# Patient Record
Sex: MALE | Race: WHITE | NOT HISPANIC OR LATINO | Employment: FULL TIME | URBAN - METROPOLITAN AREA
[De-identification: names, ages, dates, MRNs, and addresses within clinical notes are randomized per-mention and may not be internally consistent; named-entity substitution may affect disease eponyms.]

---

## 2023-11-01 ENCOUNTER — APPOINTMENT (OUTPATIENT)
Dept: RADIOLOGY | Facility: MEDICAL CENTER | Age: 28
End: 2023-11-01
Attending: STUDENT IN AN ORGANIZED HEALTH CARE EDUCATION/TRAINING PROGRAM
Payer: COMMERCIAL

## 2023-11-01 ENCOUNTER — HOSPITAL ENCOUNTER (EMERGENCY)
Facility: MEDICAL CENTER | Age: 28
End: 2023-11-01
Attending: STUDENT IN AN ORGANIZED HEALTH CARE EDUCATION/TRAINING PROGRAM
Payer: COMMERCIAL

## 2023-11-01 VITALS
HEART RATE: 59 BPM | TEMPERATURE: 98.3 F | OXYGEN SATURATION: 95 % | RESPIRATION RATE: 18 BRPM | BODY MASS INDEX: 33.8 KG/M2 | DIASTOLIC BLOOD PRESSURE: 72 MMHG | WEIGHT: 277.56 LBS | HEIGHT: 76 IN | SYSTOLIC BLOOD PRESSURE: 115 MMHG

## 2023-11-01 DIAGNOSIS — R00.2 PALPITATIONS: ICD-10-CM

## 2023-11-01 DIAGNOSIS — E87.6 HYPOKALEMIA: ICD-10-CM

## 2023-11-01 LAB
ALBUMIN SERPL BCP-MCNC: 4.3 G/DL (ref 3.2–4.9)
ALBUMIN/GLOB SERPL: 1.5 G/DL
ALP SERPL-CCNC: 60 U/L (ref 30–99)
ALT SERPL-CCNC: 20 U/L (ref 2–50)
ANION GAP SERPL CALC-SCNC: 11 MMOL/L (ref 7–16)
AST SERPL-CCNC: 29 U/L (ref 12–45)
BASOPHILS # BLD AUTO: 0.6 % (ref 0–1.8)
BASOPHILS # BLD: 0.06 K/UL (ref 0–0.12)
BILIRUB SERPL-MCNC: 0.4 MG/DL (ref 0.1–1.5)
BUN SERPL-MCNC: 17 MG/DL (ref 8–22)
CALCIUM ALBUM COR SERPL-MCNC: 9.2 MG/DL (ref 8.5–10.5)
CALCIUM SERPL-MCNC: 9.4 MG/DL (ref 8.5–10.5)
CHLORIDE SERPL-SCNC: 105 MMOL/L (ref 96–112)
CO2 SERPL-SCNC: 23 MMOL/L (ref 20–33)
CREAT SERPL-MCNC: 0.91 MG/DL (ref 0.5–1.4)
EKG IMPRESSION: NORMAL
EOSINOPHIL # BLD AUTO: 0.32 K/UL (ref 0–0.51)
EOSINOPHIL NFR BLD: 3 % (ref 0–6.9)
ERYTHROCYTE [DISTWIDTH] IN BLOOD BY AUTOMATED COUNT: 40.5 FL (ref 35.9–50)
GFR SERPLBLD CREATININE-BSD FMLA CKD-EPI: 118 ML/MIN/1.73 M 2
GLOBULIN SER CALC-MCNC: 2.9 G/DL (ref 1.9–3.5)
GLUCOSE SERPL-MCNC: 104 MG/DL (ref 65–99)
HCT VFR BLD AUTO: 44.5 % (ref 42–52)
HGB BLD-MCNC: 14.5 G/DL (ref 14–18)
IMM GRANULOCYTES # BLD AUTO: 0.05 K/UL (ref 0–0.11)
IMM GRANULOCYTES NFR BLD AUTO: 0.5 % (ref 0–0.9)
LYMPHOCYTES # BLD AUTO: 2.47 K/UL (ref 1–4.8)
LYMPHOCYTES NFR BLD: 22.9 % (ref 22–41)
MAGNESIUM SERPL-MCNC: 2.1 MG/DL (ref 1.5–2.5)
MCH RBC QN AUTO: 27 PG (ref 27–33)
MCHC RBC AUTO-ENTMCNC: 32.6 G/DL (ref 32.3–36.5)
MCV RBC AUTO: 82.7 FL (ref 81.4–97.8)
MONOCYTES # BLD AUTO: 1.09 K/UL (ref 0–0.85)
MONOCYTES NFR BLD AUTO: 10.1 % (ref 0–13.4)
NEUTROPHILS # BLD AUTO: 6.78 K/UL (ref 1.82–7.42)
NEUTROPHILS NFR BLD: 62.9 % (ref 44–72)
NRBC # BLD AUTO: 0 K/UL
NRBC BLD-RTO: 0 /100 WBC (ref 0–0.2)
PLATELET # BLD AUTO: 238 K/UL (ref 164–446)
PMV BLD AUTO: 9.7 FL (ref 9–12.9)
POTASSIUM SERPL-SCNC: 3.5 MMOL/L (ref 3.6–5.5)
PROT SERPL-MCNC: 7.2 G/DL (ref 6–8.2)
RBC # BLD AUTO: 5.38 M/UL (ref 4.7–6.1)
SODIUM SERPL-SCNC: 139 MMOL/L (ref 135–145)
TROPONIN T SERPL-MCNC: <6 NG/L (ref 6–19)
WBC # BLD AUTO: 10.8 K/UL (ref 4.8–10.8)

## 2023-11-01 PROCEDURE — 85025 COMPLETE CBC W/AUTO DIFF WBC: CPT

## 2023-11-01 PROCEDURE — 700102 HCHG RX REV CODE 250 W/ 637 OVERRIDE(OP): Mod: JZ | Performed by: STUDENT IN AN ORGANIZED HEALTH CARE EDUCATION/TRAINING PROGRAM

## 2023-11-01 PROCEDURE — 36415 COLL VENOUS BLD VENIPUNCTURE: CPT

## 2023-11-01 PROCEDURE — 71045 X-RAY EXAM CHEST 1 VIEW: CPT

## 2023-11-01 PROCEDURE — 80053 COMPREHEN METABOLIC PANEL: CPT

## 2023-11-01 PROCEDURE — 83735 ASSAY OF MAGNESIUM: CPT

## 2023-11-01 PROCEDURE — 84484 ASSAY OF TROPONIN QUANT: CPT

## 2023-11-01 PROCEDURE — 93005 ELECTROCARDIOGRAM TRACING: CPT

## 2023-11-01 PROCEDURE — 99283 EMERGENCY DEPT VISIT LOW MDM: CPT

## 2023-11-01 PROCEDURE — 93005 ELECTROCARDIOGRAM TRACING: CPT | Performed by: STUDENT IN AN ORGANIZED HEALTH CARE EDUCATION/TRAINING PROGRAM

## 2023-11-01 PROCEDURE — A9270 NON-COVERED ITEM OR SERVICE: HCPCS | Mod: JZ | Performed by: STUDENT IN AN ORGANIZED HEALTH CARE EDUCATION/TRAINING PROGRAM

## 2023-11-01 RX ORDER — POTASSIUM CHLORIDE 20 MEQ/1
20 TABLET, EXTENDED RELEASE ORAL ONCE
Status: COMPLETED | OUTPATIENT
Start: 2023-11-01 | End: 2023-11-01

## 2023-11-01 RX ADMIN — POTASSIUM CHLORIDE 20 MEQ: 1500 TABLET, EXTENDED RELEASE ORAL at 22:40

## 2023-11-02 NOTE — ED PROVIDER NOTES
"ED Provider Note    CHIEF COMPLAINT  Chief Complaint   Patient presents with    Palpitations     Pt states he was laying in bed and suddenly his heart started racing, he became dizzy light headed, lasting for 5 min then \"heart calmed down\".        EXTERNAL RECORDS REVIEWED  None    HPI/ROS  LIMITATION TO HISTORY   Select: : None  OUTSIDE HISTORIAN(S):  None    Bernard Staples is a 28 y.o. male who presents with palpitations.  Patient says that he was lying in bed this evening when he suddenly felt his heart racing in his chest.  He also stated that it felt irregular. He felt lightheaded and slightly short of breath when he had the palpitations.  He denies any associated chest pain but he did feel a 'thud' in his chest.  He says that it seemed to improve however he had another episode while in the waiting room here.   None of these episodes have been associated with exertion.  He reports feeling well throughout the day today, no recent illness.  It was his birthday yesterday and he does not endorse some alcohol use, but is not a daily or heavy drinker.  He denies any other recreational drug drug use.  He has no prior cardiac history but did wear a Holter monitor as a child, reports no significant findings.  No family history of sudden cardiac death.  Today he has had normal p.o. intake.  He denies any symptoms or palpitations at this time.    PAST MEDICAL HISTORY   Denies    SURGICAL HISTORY  patient denies any surgical history    FAMILY HISTORY  History reviewed. No pertinent family history.    SOCIAL HISTORY  Social History     Tobacco Use    Smoking status: Never    Smokeless tobacco: Never   Vaping Use    Vaping Use: Every day    Substances: Nicotine, THC   Substance and Sexual Activity    Alcohol use: Yes     Comment: occ    Drug use: Yes     Types: Inhaled     Comment: marijuana    Sexual activity: Not on file       CURRENT MEDICATIONS  Home Medications    **Home medications have not yet been reviewed for " "this encounter**         ALLERGIES  No Known Allergies    PHYSICAL EXAM  VITAL SIGNS: /72   Pulse (!) 59   Temp 36.8 °C (98.3 °F)   Resp 18   Ht 1.93 m (6' 4\")   Wt (!) 126 kg (277 lb 9 oz)   SpO2 95%   BMI 33.79 kg/m²    Constitutional: Awake and alert . Non toxic  HENT: Normal inspection   Eyes: Normal inspection  Neck: Grossly normal range of motion.  Cardiovascular: Normal heart rate, Normal rhythm.  Symmetric peripheral pulses.   Thorax & Lungs: No respiratory distress, No wheezing, No rales, No rhonchi, No chest tenderness.   Abdomen: Soft, non-distended, nontender to palpation in all 4 quadrants, no mass  Skin: No obvious rash.  Extremities: Warm, well perfused. No clubbing, cyanosis. No lower extremity edema   Neurologic: Grossly normal   Psychiatric: Normal for situation      DIAGNOSTIC STUDIES / PROCEDURES  EKG  I have independently interpreted this EKG  Results for orders placed or performed during the hospital encounter of 23   EKG (NOW)   Result Value Ref Range    Report       [facility] Emergency Dept.    Test Date:  2023  Pt Name:    CLINT ARANDA                   Department: ER  MRN:        [id number]                      Room:  Gender:     Male                         Technician: [id number]  :        1995                   Requested By:ER TRIAGE PROTOCOL  Order #:    [id number]                    Reading MD: Carrie Rea    Measurements  Intervals                                Axis  Rate:       67                           P:          27  VT:         168                          QRS:        -45  QRSD:       117                          T:          27  QT:         416  QTc:        439    Interpretive Statements  Sinus rhythm  Incomplete RBBB and LAFB  No previous ECG available for comparison  Electronically Signed On 2023 20:58:12 PDT by Carrie Rea           LABS  Results for orders placed or performed during the hospital encounter of 23   CBC " WITH DIFFERENTIAL   Result Value Ref Range    WBC 10.8 4.8 - 10.8 K/uL    RBC 5.38 4.70 - 6.10 M/uL    Hemoglobin 14.5 14.0 - 18.0 g/dL    Hematocrit 44.5 42.0 - 52.0 %    MCV 82.7 81.4 - 97.8 fL    MCH 27.0 27.0 - 33.0 pg    MCHC 32.6 32.3 - 36.5 g/dL    RDW 40.5 35.9 - 50.0 fL    Platelet Count 238 164 - 446 K/uL    MPV 9.7 9.0 - 12.9 fL    Neutrophils-Polys 62.90 44.00 - 72.00 %    Lymphocytes 22.90 22.00 - 41.00 %    Monocytes 10.10 0.00 - 13.40 %    Eosinophils 3.00 0.00 - 6.90 %    Basophils 0.60 0.00 - 1.80 %    Immature Granulocytes 0.50 0.00 - 0.90 %    Nucleated RBC 0.00 0.00 - 0.20 /100 WBC    Neutrophils (Absolute) 6.78 1.82 - 7.42 K/uL    Lymphs (Absolute) 2.47 1.00 - 4.80 K/uL    Monos (Absolute) 1.09 (H) 0.00 - 0.85 K/uL    Eos (Absolute) 0.32 0.00 - 0.51 K/uL    Baso (Absolute) 0.06 0.00 - 0.12 K/uL    Immature Granulocytes (abs) 0.05 0.00 - 0.11 K/uL    NRBC (Absolute) 0.00 K/uL   COMP METABOLIC PANEL   Result Value Ref Range    Sodium 139 135 - 145 mmol/L    Potassium 3.5 (L) 3.6 - 5.5 mmol/L    Chloride 105 96 - 112 mmol/L    Co2 23 20 - 33 mmol/L    Anion Gap 11.0 7.0 - 16.0    Glucose 104 (H) 65 - 99 mg/dL    Bun 17 8 - 22 mg/dL    Creatinine 0.91 0.50 - 1.40 mg/dL    Calcium 9.4 8.5 - 10.5 mg/dL    Correct Calcium 9.2 8.5 - 10.5 mg/dL    AST(SGOT) 29 12 - 45 U/L    ALT(SGPT) 20 2 - 50 U/L    Alkaline Phosphatase 60 30 - 99 U/L    Total Bilirubin 0.4 0.1 - 1.5 mg/dL    Albumin 4.3 3.2 - 4.9 g/dL    Total Protein 7.2 6.0 - 8.2 g/dL    Globulin 2.9 1.9 - 3.5 g/dL    A-G Ratio 1.5 g/dL   TROPONIN   Result Value Ref Range    Troponin T <6 6 - 19 ng/L   MAGNESIUM   Result Value Ref Range    Magnesium 2.1 1.5 - 2.5 mg/dL   ESTIMATED GFR   Result Value Ref Range    GFR (CKD-EPI) 118 >60 mL/min/1.73 m 2   EKG (NOW)   Result Value Ref Range    Report       [facility] Emergency Dept.    Test Date:  2023-11-01  Pt Name:    CLINT ARANDA                   Department: ER  MRN:        [id number]                       Room:  Gender:     Male                         Technician: [id number]  :        1995                   Requested By:ER TRIAGE PROTOCOL  Order #:    [id number]                    Reading MD: Carrie Rea    Measurements  Intervals                                Axis  Rate:       67                           P:          27  TX:         168                          QRS:        -45  QRSD:       117                          T:          27  QT:         416  QTc:        439    Interpretive Statements  Sinus rhythm  Incomplete RBBB and LAFB  No previous ECG available for comparison  Electronically Signed On 2023 20:58:12 PDT by Carrie Rea           RADIOLOGY  I have independently interpreted the diagnostic imaging associated with this visit and am waiting the final reading from the radiologist.   My preliminary interpretation is as follows: No cardiomegaly   Radiologist interpretation:   DX-CHEST-PORTABLE (1 VIEW)   Final Result         1.  No acute cardiopulmonary disease.            COURSE & MEDICAL DECISION MAKING    ED Observation Status? No; Patient does not meet criteria for ED Observation.     INITIAL ASSESSMENT, COURSE AND PLAN  Care Narrative: Patient is a 28 y.o. male who presents to the Emergency Department, with feelings of palpitations.     Differential diagnoses considered include, but not limited to: A. fib; a flutter; SVT; PVCs; WPW: heart block; prolonged QTc; electrolyte disturbances; ACS; anemia; caffeine or alcohol induced palpitations.    Patient arrived stable, afebrile, and in good condition. Patient placed on telemetry for monitoring.   ECG reassuring and notable for no arrhythmia, heart block, prolonged QTc, PVC's or WPW. Labs notable for negative troponin, no anemia, and no significant electrolyte abnormalities, except mild hypokalemia (this was repleted orally).  ACS unlikely given negative troponin, reassuring ECG, no risk factors for ACS, and Heart Score of zero.  He has no murmur or high risk features to warrant prolonged observation or admission to the hospital. Etiology of palpitations unclear.  He had no abnormal events noted on telemetry during his time in the ER and he remains asymptomatic.  Patient was ultimately discharged home with strict return precautions, instructions to follow up with PCP for Holter monitor placement and minimize any alcohol intake.    Strict return precautions and follow-up instructions were given, as detailed in patient's discharge paperwork.        DISPOSITION AND DISCUSSIONS  I have discussed management of the patient with the following physicians and LJ's:  None    Discussion of management with other Q or appropriate source(s): None     Escalation of care considered, and ultimately not performed:acute inpatient care management, however at this time, the patient is most appropriate for outpatient management    Barriers to care at this time, including but not limited to: Patient does not have established PCP.     Decision tools and prescription drugs considered including, but not limited to: HEART Score 0 .    FINAL DIAGNOSIS  1. Palpitations Acute   2. Hypokalemia           Electronically signed by: Carrie Rea M.D., 11/1/2023 9:04 PM

## 2023-11-02 NOTE — DISCHARGE INSTRUCTIONS
Please follow-up as an outpatient. Your primary doctor can set you up with a Holter Monitor.    Please return if you have chest pain, shortness of breath, persistent or worsening symptoms.

## 2023-11-02 NOTE — ED NOTES
Pt and family/friend at bedside educated regarding discharge instructions and demonstrated understanding. Pt ambulatory upon discharge and does not appear to be in any distress at this time. Pt's discharge paperwork and belongings sent home with pt and family/friend.

## 2023-11-02 NOTE — ED TRIAGE NOTES
"Chief Complaint   Patient presents with    Palpitations     Pt states he was laying in bed and suddenly his heart started racing, he became dizzy light headed, lasting for 5 min then \"heart calmed down\".      Pt ambulatory to triage for above complaint. Pt reports palpitations have been intermittent for past hour lasting only 5 min. Pt denies other recreational drug use besides marijuana, drinking high caffeinated beverages, or any chest pain.    Pt is alert/oriented and follows commands. Pt speaking in full sentences and responds appropriately to questions. No acute distress noted in triage and respirations are even and unlabored.     Pt placed in lobby and educated on triage process. Pt encouraged to alert staff for any changes in condition.    "

## 2023-12-05 ENCOUNTER — APPOINTMENT (OUTPATIENT)
Dept: MEDICAL GROUP | Facility: PHYSICIAN GROUP | Age: 28
End: 2023-12-05
Attending: STUDENT IN AN ORGANIZED HEALTH CARE EDUCATION/TRAINING PROGRAM
Payer: COMMERCIAL

## 2024-01-24 ENCOUNTER — OFFICE VISIT (OUTPATIENT)
Dept: MEDICAL GROUP | Facility: PHYSICIAN GROUP | Age: 29
End: 2024-01-24
Attending: STUDENT IN AN ORGANIZED HEALTH CARE EDUCATION/TRAINING PROGRAM
Payer: COMMERCIAL

## 2024-01-24 VITALS
HEIGHT: 76 IN | DIASTOLIC BLOOD PRESSURE: 70 MMHG | WEIGHT: 293 LBS | TEMPERATURE: 98.8 F | BODY MASS INDEX: 35.68 KG/M2 | OXYGEN SATURATION: 94 % | SYSTOLIC BLOOD PRESSURE: 120 MMHG | HEART RATE: 76 BPM

## 2024-01-24 DIAGNOSIS — M25.562 ACUTE PAIN OF LEFT KNEE: Primary | ICD-10-CM

## 2024-01-24 PROCEDURE — 3078F DIAST BP <80 MM HG: CPT | Performed by: NURSE PRACTITIONER

## 2024-01-24 PROCEDURE — 99214 OFFICE O/P EST MOD 30 MIN: CPT | Performed by: NURSE PRACTITIONER

## 2024-01-24 PROCEDURE — 3074F SYST BP LT 130 MM HG: CPT | Performed by: NURSE PRACTITIONER

## 2024-01-24 ASSESSMENT — PATIENT HEALTH QUESTIONNAIRE - PHQ9: CLINICAL INTERPRETATION OF PHQ2 SCORE: 0

## 2024-01-24 ASSESSMENT — ENCOUNTER SYMPTOMS
HEADACHES: 0
SHORTNESS OF BREATH: 0
ABDOMINAL PAIN: 0
CHILLS: 0
VOMITING: 0
DIZZINESS: 0
NAUSEA: 0
FEVER: 0
PALPITATIONS: 0

## 2024-01-24 ASSESSMENT — FIBROSIS 4 INDEX: FIB4 SCORE: 0.76

## 2024-01-24 NOTE — PROGRESS NOTES
"Subjective:     CC: Establish care    HPI:   Bernard is a 28 y.o. male presents to establish care. Previous PCP: none. Specialists: None. Pt would like to discuss the following today:    Problem   Left Knee Pain    States that 2 weeks ago, he stepped into the pool and his foot slipped out and his knee twisted out and he felt his knee pop. It became immediately swollen and quite painful, 7/10. He was able to walk afterwards, but it was painful. Has been resting it as much as he can. Icing and using compression. He states that the pain has been improving (now at 3/10). States that the swelling has slowly improving.         Health Maintenance/Immunizations: Completed    No current outpatient medications on file.     No current facility-administered medications for this visit.     History reviewed. No pertinent past medical history.    History reviewed. No pertinent surgical history.     Family History   Problem Relation Age of Onset    Heart Attack Maternal Uncle         around 54 y/o (was a heavy drinker)     Social History     Tobacco Use    Smoking status: Never    Smokeless tobacco: Never   Vaping Use    Vaping Use: Every day    Substances: Nicotine, THC   Substance Use Topics    Alcohol use: Yes     Alcohol/week: 5.4 oz     Types: 9 Cans of beer per week    Drug use: Yes     Types: Inhaled, Marijuana     Comment: marijuana      Review of Systems   Constitutional:  Negative for chills and fever.   Respiratory:  Negative for shortness of breath.    Cardiovascular:  Negative for chest pain and palpitations.   Gastrointestinal:  Negative for abdominal pain, nausea and vomiting.   Neurological:  Negative for dizziness and headaches.      Objective:     /70 (BP Location: Left arm, Patient Position: Sitting, BP Cuff Size: Adult)   Pulse 76   Temp 37.1 °C (98.8 °F) (Temporal)   Ht 1.93 m (6' 4\")   Wt (!) 133 kg (293 lb)   SpO2 94%   BMI 35.67 kg/m²  Body mass index is 35.67 kg/m².     Physical " Exam  Constitutional:       Appearance: Normal appearance.   Eyes:      Conjunctiva/sclera: Conjunctivae normal.      Pupils: Pupils are equal, round, and reactive to light.   Cardiovascular:      Rate and Rhythm: Normal rate and regular rhythm.      Heart sounds: Normal heart sounds. No murmur heard.  Pulmonary:      Effort: Pulmonary effort is normal. No respiratory distress.      Breath sounds: Normal breath sounds.   Musculoskeletal:      Left knee: Swelling present. Decreased range of motion. Tenderness present over the medial joint line.      Instability Tests: Anterior drawer test negative. Medial Jovon test positive.   Lymphadenopathy:      Cervical: No cervical adenopathy.   Skin:     General: Skin is warm and dry.   Neurological:      General: No focal deficit present.      Mental Status: He is alert and oriented to person, place, and time.   Psychiatric:         Mood and Affect: Mood normal.         Behavior: Behavior normal.        Assessment and Plan:   28 y.o. male with the following -    1. Acute pain of left knee  Acute problem. Very strong suspicion for meniscal injury, but will obtain imaging to evaluate this further. Recommended continuing with compression, ice and elevation. Physical therapy recommended, but he declined due to time constraints. Handout with exercises provided. Discussed return to clinic precautions. He was agreeable to plan.   - MR-KNEE-W/O LEFT; Future    I spent a total of 32 minutes with record review, exam, communication with the patient, communication with other providers, and documentation of this encounter.    Return if symptoms worsen or fail to improve.    Please note that this dictation was created using voice recognition software. I have made every reasonable attempt to correct obvious errors, but I expect that there are errors of grammar and possibly content that I did not discover before finalizing the note.

## 2024-02-08 ENCOUNTER — OFFICE VISIT (OUTPATIENT)
Dept: DERMATOLOGY | Facility: IMAGING CENTER | Age: 29
End: 2024-02-08
Payer: COMMERCIAL

## 2024-02-08 DIAGNOSIS — L72.3 SEBACEOUS CYST: ICD-10-CM

## 2024-02-08 DIAGNOSIS — L98.9 ENLARGING SKIN LESION: ICD-10-CM

## 2024-02-08 DIAGNOSIS — D22.9 NEVUS: ICD-10-CM

## 2024-02-08 DIAGNOSIS — L90.5 SCAR: ICD-10-CM

## 2024-02-08 PROBLEM — R10.13 EPIGASTRIC ABDOMINAL PAIN: Status: ACTIVE | Noted: 2020-07-09

## 2024-02-08 PROBLEM — A74.9 CHLAMYDIA INFECTION: Status: ACTIVE | Noted: 2019-04-14

## 2024-02-08 PROBLEM — F90.0 ATTENTION DEFICIT HYPERACTIVITY DISORDER (ADHD), PREDOMINANTLY INATTENTIVE TYPE: Chronic | Status: ACTIVE | Noted: 2021-01-29

## 2024-02-08 PROCEDURE — 99203 OFFICE O/P NEW LOW 30 MIN: CPT | Performed by: DERMATOLOGY

## 2024-02-08 RX ORDER — DOXYCYCLINE HYCLATE 100 MG
100 TABLET ORAL 2 TIMES DAILY
Qty: 28 TABLET | Refills: 0 | Status: SHIPPED | OUTPATIENT
Start: 2024-02-08

## 2024-02-08 NOTE — PROGRESS NOTES
CC: Spot on rt cheek. Left cheek lesion      Subjective: New Patient here for a spot on rt cheek. Pt states it was a cyst that was treated before but now its bump  Pt also has a lesion on left cheek for evaluation    History of skin cancer: No  History of precancers/actinic keratoses: No  History of biopsies:Yes, Details: Benign  History of blistering/severe sunburns:Yes, Details: back  Family history of skin cancer:No  Family history of atypical moles:No      ROS: no fevers/chills. No itch.  No cough  Relevant PMH:NC  Social: NS    PE: Gen:WDWN male in NAD. Skin: focal exam: scalp on scalp. Cystic nodule approx 1cm on right facial cheek, minimal erythema and no notable TTP.  Left facial cheek - skin colored papule appearing benign      A/P: cyst/growing, hx of infections: right face  -reviewed dx/tx  -doxycycline 100mg PO BID, se reviewed  -f/u PRN Surgery - reviewed R/B/A and PA completed to schedule    Nevus, left face:   -benign/reassurance    Scar scalp: well healed    I have reviewed medications relevant to my specialty.

## 2024-02-22 ENCOUNTER — OFFICE VISIT (OUTPATIENT)
Dept: DERMATOLOGY | Facility: IMAGING CENTER | Age: 29
End: 2024-02-22
Payer: COMMERCIAL

## 2024-02-22 VITALS
BODY MASS INDEX: 32.88 KG/M2 | DIASTOLIC BLOOD PRESSURE: 90 MMHG | WEIGHT: 270 LBS | SYSTOLIC BLOOD PRESSURE: 122 MMHG | TEMPERATURE: 99.2 F | HEIGHT: 76 IN

## 2024-02-22 DIAGNOSIS — L72.3 SEBACEOUS CYST: ICD-10-CM

## 2024-02-22 ASSESSMENT — FIBROSIS 4 INDEX: FIB4 SCORE: 0.76

## 2024-02-23 NOTE — PROGRESS NOTES
"BENIGN EXCISION PROCEDURE NOTE:    Risks, benefits and alternatives of procedure, including, but not limited to scar/poor cosmetic outcome, bleeding, pain, infection, nerve damage, recurrence of lesion, failed surgery, and need for further surgery, were discussed and written informed consent obtained. Verbal time out completed.     Allergies reviewed: Yes  Pacemaker/defibrillator: No  Artificial joints: No  Antibiotics given: No  Blood thinners/anticoagulants: No    Pre-op diagnosis: cyst/shrinking skin lesion  Post-op diagnosis: Same  Indication: symptomatic (history of enlargement)    Site:right cheek  Pre-op size: 0.8cm  Post-op antibiotics: no    BP (!) 122/90   Temp 37.3 °C (99.2 °F)   Ht 1.93 m (6' 4\")   Wt 122 kg (270 lb)     Procedure: Area of surgery was prepped with alcohol, marked with a sterile marking pen. Anesthesia with 1% lidocaine with epinephrine administered with 30 gauge needle. The area was again cleaned with chlorhexidine swab. With sterile technique, an 8mm punch instrument was used to make an incision over the lesion to the level of the subcutaneous fat. Dissection was completed with iris/tissue scissors, and the mass was removed. Hemostasis was achieved with electrodessication.  Specimen was placed into biopsy container and sent to pathology by staff.    Intermediate closure note:  5.0 monocryl buried vertical mattress sutures were placed to close dead space. 6.0 prolene superficial interrupted sutures were placed to approximate wound edges.  Vaseline applied to wound with bandage. Patient tolerated procedure well and there were no complications, blood loss was minimal.     Final wound size: 1cm    Bandage was placed with vaseline, telfa, gauze and tape. Wound care was discussed with the patient, and written instructions were provided. Patient to return to clinic in 7 days for suture removal. Patient to call us if any problems or concerns with the procedure site arise prior to scheduled " appointment.    Brea Treviño MD

## 2024-04-08 ENCOUNTER — HOSPITAL ENCOUNTER (OUTPATIENT)
Dept: LAB | Facility: MEDICAL CENTER | Age: 29
End: 2024-04-08
Attending: NURSE PRACTITIONER
Payer: COMMERCIAL

## 2024-04-08 ENCOUNTER — OFFICE VISIT (OUTPATIENT)
Dept: MEDICAL GROUP | Facility: PHYSICIAN GROUP | Age: 29
End: 2024-04-08
Payer: COMMERCIAL

## 2024-04-08 VITALS
OXYGEN SATURATION: 98 % | BODY MASS INDEX: 32.88 KG/M2 | WEIGHT: 270 LBS | HEART RATE: 80 BPM | TEMPERATURE: 98.3 F | SYSTOLIC BLOOD PRESSURE: 140 MMHG | DIASTOLIC BLOOD PRESSURE: 80 MMHG | HEIGHT: 76 IN

## 2024-04-08 DIAGNOSIS — R10.13 EPIGASTRIC ABDOMINAL PAIN: ICD-10-CM

## 2024-04-08 LAB
ALBUMIN SERPL BCP-MCNC: 5 G/DL (ref 3.2–4.9)
ALBUMIN/GLOB SERPL: 1.5 G/DL
ALP SERPL-CCNC: 59 U/L (ref 30–99)
ALT SERPL-CCNC: 10 U/L (ref 2–50)
ANION GAP SERPL CALC-SCNC: 15 MMOL/L (ref 7–16)
AST SERPL-CCNC: 22 U/L (ref 12–45)
BASOPHILS # BLD AUTO: 0.6 % (ref 0–1.8)
BASOPHILS # BLD: 0.05 K/UL (ref 0–0.12)
BILIRUB SERPL-MCNC: 0.9 MG/DL (ref 0.1–1.5)
BUN SERPL-MCNC: 15 MG/DL (ref 8–22)
CALCIUM ALBUM COR SERPL-MCNC: 9.3 MG/DL (ref 8.5–10.5)
CALCIUM SERPL-MCNC: 10.1 MG/DL (ref 8.5–10.5)
CHLORIDE SERPL-SCNC: 102 MMOL/L (ref 96–112)
CO2 SERPL-SCNC: 19 MMOL/L (ref 20–33)
CREAT SERPL-MCNC: 0.99 MG/DL (ref 0.5–1.4)
EOSINOPHIL # BLD AUTO: 0.08 K/UL (ref 0–0.51)
EOSINOPHIL NFR BLD: 1 % (ref 0–6.9)
ERYTHROCYTE [DISTWIDTH] IN BLOOD BY AUTOMATED COUNT: 39.3 FL (ref 35.9–50)
GFR SERPLBLD CREATININE-BSD FMLA CKD-EPI: 106 ML/MIN/1.73 M 2
GLOBULIN SER CALC-MCNC: 3.3 G/DL (ref 1.9–3.5)
GLUCOSE SERPL-MCNC: 92 MG/DL (ref 65–99)
HCT VFR BLD AUTO: 51.2 % (ref 42–52)
HGB BLD-MCNC: 17.4 G/DL (ref 14–18)
IMM GRANULOCYTES # BLD AUTO: 0.03 K/UL (ref 0–0.11)
IMM GRANULOCYTES NFR BLD AUTO: 0.4 % (ref 0–0.9)
LIPASE SERPL-CCNC: 26 U/L (ref 11–82)
LYMPHOCYTES # BLD AUTO: 2.23 K/UL (ref 1–4.8)
LYMPHOCYTES NFR BLD: 28.7 % (ref 22–41)
MCH RBC QN AUTO: 27.8 PG (ref 27–33)
MCHC RBC AUTO-ENTMCNC: 34 G/DL (ref 32.3–36.5)
MCV RBC AUTO: 81.9 FL (ref 81.4–97.8)
MONOCYTES # BLD AUTO: 0.81 K/UL (ref 0–0.85)
MONOCYTES NFR BLD AUTO: 10.4 % (ref 0–13.4)
NEUTROPHILS # BLD AUTO: 4.57 K/UL (ref 1.82–7.42)
NEUTROPHILS NFR BLD: 58.9 % (ref 44–72)
NRBC # BLD AUTO: 0 K/UL
NRBC BLD-RTO: 0 /100 WBC (ref 0–0.2)
PLATELET # BLD AUTO: 298 K/UL (ref 164–446)
PMV BLD AUTO: 10.4 FL (ref 9–12.9)
POTASSIUM SERPL-SCNC: 4.2 MMOL/L (ref 3.6–5.5)
PROT SERPL-MCNC: 8.3 G/DL (ref 6–8.2)
RBC # BLD AUTO: 6.25 M/UL (ref 4.7–6.1)
SODIUM SERPL-SCNC: 136 MMOL/L (ref 135–145)
TSH SERPL DL<=0.005 MIU/L-ACNC: 1.58 UIU/ML (ref 0.38–5.33)
WBC # BLD AUTO: 7.8 K/UL (ref 4.8–10.8)

## 2024-04-08 PROCEDURE — 84443 ASSAY THYROID STIM HORMONE: CPT

## 2024-04-08 PROCEDURE — 36415 COLL VENOUS BLD VENIPUNCTURE: CPT

## 2024-04-08 PROCEDURE — 3077F SYST BP >= 140 MM HG: CPT | Performed by: NURSE PRACTITIONER

## 2024-04-08 PROCEDURE — 3079F DIAST BP 80-89 MM HG: CPT | Performed by: NURSE PRACTITIONER

## 2024-04-08 PROCEDURE — 83690 ASSAY OF LIPASE: CPT

## 2024-04-08 PROCEDURE — 80053 COMPREHEN METABOLIC PANEL: CPT

## 2024-04-08 PROCEDURE — 99213 OFFICE O/P EST LOW 20 MIN: CPT | Performed by: NURSE PRACTITIONER

## 2024-04-08 PROCEDURE — 85025 COMPLETE CBC W/AUTO DIFF WBC: CPT

## 2024-04-08 ASSESSMENT — FIBROSIS 4 INDEX: FIB4 SCORE: 0.76

## 2024-04-08 ASSESSMENT — ENCOUNTER SYMPTOMS
PALPITATIONS: 1
CHILLS: 0
DIZZINESS: 1
HEADACHES: 0
SHORTNESS OF BREATH: 0
ABDOMINAL PAIN: 1
FEVER: 0
WEIGHT LOSS: 0

## 2024-04-08 NOTE — PROGRESS NOTES
Subjective:     CC: Heart racing and abdominal pain    HPI:   Bernard presents today with abdominal pain. He reports his symptoms started 1 week ago. He initially developed an episode of lightheadedness and felt his heart racing. This episode lasted for about 1-2 hours. He states that his systolic blood pressure was in the 160s and pulse in the 140s at rest. He had a similar episode in 11/2023, in which he was evaluated at the ER with a normal EKG and labs.   The following day, he woke up with epigastric abdominal pain and diarrhea. Diarrhea lasted about 3 days. He also felt tingling in his legs and hands. Since Saturday, he has been taking OTC Tums and Pepto bismol, and Zantac 360 twice a day. Reports that Zantac has been most effective in reducing his symptoms.   His symptoms have been intermittent since they started about 1 week ago. Denies nausea/vomiting. He endorses some tightness in his chest, but denies heartburn. He does endorse increased flatulence and gases.    Aggravating factors: tomato soup  Sick contacts: none  Denies recent antibiotic use or travel. Denies chronic NSAID use or chronic alcohol use.   Of note, he did get a new nicotine stick, but he stopped using it since onset of symptoms. He has not had any alcohol or soda as well.   The diarrhea resolved after 3 days and currently, he is having constipation (hard, small stools). Denies bloody stools, but states that his stool is darker than usual.     Health Maintenance: Completed    No current outpatient medications on file.     No current facility-administered medications for this visit.     ROS:  Review of Systems   Constitutional:  Negative for chills, fever and weight loss.   Respiratory:  Negative for shortness of breath.    Cardiovascular:  Positive for palpitations. Negative for chest pain.   Gastrointestinal:  Positive for abdominal pain.   Neurological:  Positive for dizziness. Negative for headaches.     Objective:     Exam:  BP (!) 140/80  "(BP Location: Right arm, Patient Position: Sitting, BP Cuff Size: Adult)   Pulse 80   Temp 36.8 °C (98.3 °F) (Temporal)   Ht 1.93 m (6' 4\")   Wt 122 kg (270 lb)   SpO2 98%   BMI 32.87 kg/m²  Body mass index is 32.87 kg/m².    Physical Exam  Constitutional:       Appearance: Normal appearance.   Eyes:      Conjunctiva/sclera: Conjunctivae normal.      Pupils: Pupils are equal, round, and reactive to light.   Cardiovascular:      Rate and Rhythm: Normal rate and regular rhythm.      Heart sounds: Normal heart sounds. No murmur heard.  Pulmonary:      Effort: Pulmonary effort is normal. No respiratory distress.      Breath sounds: Normal breath sounds.   Abdominal:      General: Bowel sounds are normal. There is no distension.      Palpations: Abdomen is soft.      Tenderness: There is abdominal tenderness in the epigastric area and left upper quadrant. There is no guarding.   Musculoskeletal:      Right lower leg: No edema.      Left lower leg: No edema.   Lymphadenopathy:      Cervical: No cervical adenopathy.   Skin:     General: Skin is warm and dry.   Neurological:      General: No focal deficit present.      Mental Status: He is alert and oriented to person, place, and time.   Psychiatric:         Mood and Affect: Mood normal.         Behavior: Behavior normal.       Assessment & Plan:     28 y.o. male with the following -     1. Epigastric abdominal pain  Acute problem. Various potential differentials. Vitals signs stable. Blood pressure may be elevated s/t pain. He has not had any more episode of palpitations. Discussed a monitor, and for now, he deferred. We will order lab evaluation for abdominal symptoms. Continue with OTC famotidine and consider trial with PPI therapy once labs collected. Continue with avoidance of alcohol. ER precautions discussed.   - H.PYLORI STOOL ANTIGEN; Future  - Comp Metabolic Panel; Future  - CBC WITH DIFFERENTIAL; Future  - TSH WITH REFLEX TO FT4; Future  - OCCULT BLOOD " FECES IMMUNOASSAY; Future  - LIPASE; Future    Return in about 1 week (around 4/15/2024) for abdominal pain follow up.    Please note that this dictation was created using voice recognition software. I have made every reasonable attempt to correct obvious errors, but I expect that there are errors of grammar and possibly content that I did not discover before finalizing the note.

## 2024-04-09 ENCOUNTER — HOSPITAL ENCOUNTER (OUTPATIENT)
Facility: MEDICAL CENTER | Age: 29
End: 2024-04-09
Attending: NURSE PRACTITIONER
Payer: COMMERCIAL

## 2024-04-09 DIAGNOSIS — R10.13 EPIGASTRIC ABDOMINAL PAIN: ICD-10-CM

## 2024-04-09 LAB — H PYLORI AG STL QL IA: NOT DETECTED

## 2024-04-09 PROCEDURE — 87338 HPYLORI STOOL AG IA: CPT

## 2024-04-09 PROCEDURE — 82274 ASSAY TEST FOR BLOOD FECAL: CPT

## 2024-04-12 DIAGNOSIS — R10.13 EPIGASTRIC ABDOMINAL PAIN: ICD-10-CM

## 2024-04-12 LAB — AMBIGUOUS SPECIMEN AMBIS: NORMAL

## 2024-04-13 LAB — IMM ASSAY OCC BLD FITOB: NEGATIVE

## 2024-04-15 ENCOUNTER — OFFICE VISIT (OUTPATIENT)
Dept: MEDICAL GROUP | Facility: PHYSICIAN GROUP | Age: 29
End: 2024-04-15
Payer: COMMERCIAL

## 2024-04-15 VITALS
OXYGEN SATURATION: 96 % | BODY MASS INDEX: 35.19 KG/M2 | SYSTOLIC BLOOD PRESSURE: 130 MMHG | WEIGHT: 289 LBS | HEART RATE: 98 BPM | TEMPERATURE: 97.2 F | DIASTOLIC BLOOD PRESSURE: 74 MMHG | HEIGHT: 76 IN

## 2024-04-15 DIAGNOSIS — R10.13 EPIGASTRIC ABDOMINAL PAIN: ICD-10-CM

## 2024-04-15 PROCEDURE — 3078F DIAST BP <80 MM HG: CPT | Performed by: NURSE PRACTITIONER

## 2024-04-15 PROCEDURE — 99212 OFFICE O/P EST SF 10 MIN: CPT | Performed by: NURSE PRACTITIONER

## 2024-04-15 PROCEDURE — 3075F SYST BP GE 130 - 139MM HG: CPT | Performed by: NURSE PRACTITIONER

## 2024-04-15 RX ORDER — FAMOTIDINE 20 MG/1
20 TABLET, FILM COATED ORAL 2 TIMES DAILY
COMMUNITY

## 2024-04-15 ASSESSMENT — ENCOUNTER SYMPTOMS
VOMITING: 0
CHILLS: 0
FEVER: 0
SHORTNESS OF BREATH: 0
NAUSEA: 0
ABDOMINAL PAIN: 0
WEIGHT LOSS: 0
DIZZINESS: 0
HEADACHES: 0

## 2024-04-15 ASSESSMENT — FIBROSIS 4 INDEX: FIB4 SCORE: 0.65

## 2024-04-15 NOTE — PROGRESS NOTES
"Subjective:     CC: Abdominal pain follow up    HPI:   Bernard presents today with    Epigastric abdominal pain  His labs are mostly unremarkable.  His H. pylori stool test and fecal occult stool test were negative.  Since her last visit, he reports significant improvement in his symptoms (about 85% improved).  His appetite has returned and he is back to eating spicy and greasy foods, without problem.  He continues to take famotidine 20 mg twice a day and plans to do so for total of 4 weeks. His bowel movements are normal.  He does feel some fatigue still.  He continues to abstain from energy drinks and caffeine.  He does continue to experience some epigastric discomfort and increased belching.    Health Maintenance: Completed    Current Outpatient Medications   Medication Sig Dispense Refill    famotidine (PEPCID) 20 MG Tab Take 20 mg by mouth 2 times a day.       No current facility-administered medications for this visit.     ROS:  Review of Systems   Constitutional:  Negative for chills, fever and weight loss.   Respiratory:  Negative for shortness of breath.    Cardiovascular:  Negative for chest pain.   Gastrointestinal:  Negative for abdominal pain, nausea and vomiting.   Neurological:  Negative for dizziness and headaches.     Objective:     Exam:  /74 (BP Location: Left arm, Patient Position: Sitting, BP Cuff Size: Adult)   Pulse 98   Temp 36.2 °C (97.2 °F) (Temporal)   Ht 1.93 m (6' 4\")   Wt (!) 131 kg (289 lb)   SpO2 96%   BMI 35.18 kg/m²  Body mass index is 35.18 kg/m².    Wt Readings from Last 4 Encounters:   04/15/24 (!) 131 kg (289 lb)   04/08/24 122 kg (270 lb)   02/22/24 122 kg (270 lb)   01/24/24 (!) 133 kg (293 lb)     Physical Exam  Constitutional:       Appearance: Normal appearance.   Eyes:      Conjunctiva/sclera: Conjunctivae normal.      Pupils: Pupils are equal, round, and reactive to light.   Cardiovascular:      Rate and Rhythm: Normal rate and regular rhythm.      Heart sounds: " Normal heart sounds. No murmur heard.  Pulmonary:      Effort: Pulmonary effort is normal. No respiratory distress.      Breath sounds: Normal breath sounds.   Abdominal:      Palpations: Abdomen is soft.      Tenderness: There is abdominal tenderness in the epigastric area.   Musculoskeletal:      Right lower leg: No edema.      Left lower leg: No edema.   Lymphadenopathy:      Cervical: No cervical adenopathy.   Skin:     General: Skin is warm and dry.   Neurological:      General: No focal deficit present.      Mental Status: He is alert and oriented to person, place, and time.   Psychiatric:         Mood and Affect: Mood normal.         Behavior: Behavior normal.       Assessment & Plan:     28 y.o. male with the following -     1. Epigastric abdominal pain  Acute problem.  His lab workup was grossly unremarkable.  He is almost back to baseline.  Recommended he continue famotidine for total 4 weeks and then trial discontinuing it and use it as needed moving forward.  We discussed return to clinic precautions and advised that if symptoms do worsen or persist, he may need GI evaluation.  He verbalized understanding.    I spent a total of 15 minutes with record review, exam, communication with the patient, communication with other providers, and documentation of this encounter.    Return if symptoms worsen or fail to improve.    Please note that this dictation was created using voice recognition software. I have made every reasonable attempt to correct obvious errors, but I expect that there are errors of grammar and possibly content that I did not discover before finalizing the note.

## 2024-05-01 ENCOUNTER — DOCUMENTATION (OUTPATIENT)
Dept: HEALTH INFORMATION MANAGEMENT | Facility: OTHER | Age: 29
End: 2024-05-01
Payer: COMMERCIAL

## 2024-08-02 ENCOUNTER — TELEPHONE (OUTPATIENT)
Dept: HEALTH INFORMATION MANAGEMENT | Facility: OTHER | Age: 29
End: 2024-08-02
Payer: COMMERCIAL